# Patient Record
Sex: FEMALE | Race: WHITE | Employment: OTHER | URBAN - NONMETROPOLITAN AREA
[De-identification: names, ages, dates, MRNs, and addresses within clinical notes are randomized per-mention and may not be internally consistent; named-entity substitution may affect disease eponyms.]

---

## 2017-05-22 ENCOUNTER — HOSPITAL ENCOUNTER (OUTPATIENT)
Age: 67
Discharge: HOME | End: 2017-05-22
Payer: MEDICARE

## 2017-05-22 DIAGNOSIS — E78.5 HYPERLIPIDEMIA, UNSPECIFIED HYPERLIPIDEMIA TYPE: ICD-10-CM

## 2017-05-22 DIAGNOSIS — I10 ESSENTIAL HYPERTENSION: ICD-10-CM

## 2017-05-22 LAB
ABSOLUTE EOS #: 0.2 K/UL (ref 0–0.4)
ABSOLUTE LYMPH #: 0.8 K/UL (ref 1–4.8)
ABSOLUTE MONO #: 0.5 K/UL (ref 0–1)
ALBUMIN SERPL-MCNC: 4.4 G/DL (ref 3.5–5.2)
ALBUMIN/GLOBULIN RATIO: 1.5 (ref 1–2.5)
ALP BLD-CCNC: 112 U/L (ref 35–104)
ALT SERPL-CCNC: 31 U/L (ref 5–33)
ANION GAP SERPL CALCULATED.3IONS-SCNC: 14 MMOL/L (ref 9–17)
AST SERPL-CCNC: 24 U/L
BASOPHILS # BLD: 1 %
BASOPHILS ABSOLUTE: 0 K/UL (ref 0–0.2)
BILIRUB SERPL-MCNC: 0.36 MG/DL (ref 0.3–1.2)
BUN BLDV-MCNC: 15 MG/DL (ref 8–23)
BUN/CREAT BLD: 25 (ref 9–20)
CALCIUM SERPL-MCNC: 9.4 MG/DL (ref 8.6–10.4)
CHLORIDE BLD-SCNC: 100 MMOL/L (ref 98–107)
CHOLESTEROL/HDL RATIO: 2.6
CHOLESTEROL: 151 MG/DL
CO2: 26 MMOL/L (ref 20–31)
CREAT SERPL-MCNC: 0.59 MG/DL (ref 0.5–0.9)
DIFFERENTIAL TYPE: ABNORMAL
EOSINOPHILS RELATIVE PERCENT: 3 %
GFR AFRICAN AMERICAN: >60 ML/MIN
GFR NON-AFRICAN AMERICAN: >60 ML/MIN
GFR SERPL CREATININE-BSD FRML MDRD: ABNORMAL ML/MIN/{1.73_M2}
GFR SERPL CREATININE-BSD FRML MDRD: ABNORMAL ML/MIN/{1.73_M2}
GLUCOSE BLD-MCNC: 137 MG/DL (ref 70–99)
HCT VFR BLD CALC: 38.2 % (ref 36–46)
HDLC SERPL-MCNC: 57 MG/DL
HEMOGLOBIN: 12.8 G/DL (ref 12–16)
LDL CHOLESTEROL: 75 MG/DL (ref 0–130)
LYMPHOCYTES # BLD: 13 %
MCH RBC QN AUTO: 29.8 PG (ref 26–34)
MCHC RBC AUTO-ENTMCNC: 33.5 G/DL (ref 31–37)
MCV RBC AUTO: 89.1 FL (ref 80–100)
MONOCYTES # BLD: 8 %
PDW BLD-RTO: 13.8 % (ref 12.1–15.2)
PLATELET # BLD: 281 K/UL (ref 140–450)
PLATELET ESTIMATE: ABNORMAL
PMV BLD AUTO: ABNORMAL FL (ref 6–12)
POTASSIUM SERPL-SCNC: 4.3 MMOL/L (ref 3.7–5.3)
RBC # BLD: 4.29 M/UL (ref 4–5.2)
RBC # BLD: ABNORMAL 10*6/UL
SEG NEUTROPHILS: 75 %
SEGMENTED NEUTROPHILS ABSOLUTE COUNT: 4.7 K/UL (ref 1.8–7.7)
SODIUM BLD-SCNC: 140 MMOL/L (ref 135–144)
TOTAL PROTEIN: 7.4 G/DL (ref 6.4–8.3)
TRIGL SERPL-MCNC: 97 MG/DL
VLDLC SERPL CALC-MCNC: NORMAL MG/DL (ref 1–30)
WBC # BLD: 6.2 K/UL (ref 3.5–11)
WBC # BLD: ABNORMAL 10*3/UL

## 2017-05-22 PROCEDURE — 36415 COLL VENOUS BLD VENIPUNCTURE: CPT

## 2017-05-22 PROCEDURE — 80061 LIPID PANEL: CPT

## 2017-05-22 PROCEDURE — 80053 COMPREHEN METABOLIC PANEL: CPT

## 2017-05-22 PROCEDURE — 85025 COMPLETE CBC W/AUTO DIFF WBC: CPT

## 2017-05-24 PROBLEM — E11.9 TYPE 2 DIABETES MELLITUS WITHOUT COMPLICATION, WITHOUT LONG-TERM CURRENT USE OF INSULIN (HCC): Status: ACTIVE | Noted: 2017-05-24

## 2018-03-12 ENCOUNTER — HOSPITAL ENCOUNTER (OUTPATIENT)
Age: 68
Discharge: HOME | End: 2018-03-12
Payer: MEDICARE

## 2018-03-12 DIAGNOSIS — E11.9 TYPE 2 DIABETES MELLITUS WITHOUT COMPLICATION, WITHOUT LONG-TERM CURRENT USE OF INSULIN (HCC): ICD-10-CM

## 2018-03-12 DIAGNOSIS — I10 ESSENTIAL HYPERTENSION: ICD-10-CM

## 2018-03-12 LAB
ABSOLUTE EOS #: 0.2 K/UL (ref 0–0.44)
ABSOLUTE IMMATURE GRANULOCYTE: <0.03 K/UL (ref 0–0.3)
ABSOLUTE LYMPH #: 1.18 K/UL (ref 1.1–3.7)
ABSOLUTE MONO #: 0.5 K/UL (ref 0.1–1.2)
ALBUMIN SERPL-MCNC: 4.5 G/DL (ref 3.5–5.2)
ALBUMIN/GLOBULIN RATIO: 1.6 (ref 1–2.5)
ALP BLD-CCNC: 95 U/L (ref 35–104)
ALT SERPL-CCNC: 41 U/L (ref 5–33)
ANION GAP SERPL CALCULATED.3IONS-SCNC: 11 MMOL/L (ref 9–17)
AST SERPL-CCNC: 32 U/L
BASOPHILS # BLD: 1 % (ref 0–2)
BASOPHILS ABSOLUTE: 0.06 K/UL (ref 0–0.2)
BILIRUB SERPL-MCNC: 0.37 MG/DL (ref 0.3–1.2)
BUN BLDV-MCNC: 17 MG/DL (ref 8–23)
BUN/CREAT BLD: 28 (ref 9–20)
CALCIUM SERPL-MCNC: 10.1 MG/DL (ref 8.6–10.4)
CHLORIDE BLD-SCNC: 99 MMOL/L (ref 98–107)
CHOLESTEROL/HDL RATIO: 2.3
CHOLESTEROL: 149 MG/DL
CO2: 29 MMOL/L (ref 20–31)
CREAT SERPL-MCNC: 0.61 MG/DL (ref 0.5–0.9)
DIFFERENTIAL TYPE: ABNORMAL
EOSINOPHILS RELATIVE PERCENT: 3 % (ref 1–4)
GFR AFRICAN AMERICAN: >60 ML/MIN
GFR NON-AFRICAN AMERICAN: >60 ML/MIN
GFR SERPL CREATININE-BSD FRML MDRD: ABNORMAL ML/MIN/{1.73_M2}
GFR SERPL CREATININE-BSD FRML MDRD: ABNORMAL ML/MIN/{1.73_M2}
GLUCOSE BLD-MCNC: 134 MG/DL (ref 70–99)
HCT VFR BLD CALC: 40.3 % (ref 36.3–47.1)
HDLC SERPL-MCNC: 64 MG/DL
HEMOGLOBIN: 13.2 G/DL (ref 11.9–15.1)
IMMATURE GRANULOCYTES: 0 %
LDL CHOLESTEROL: 69 MG/DL (ref 0–130)
LYMPHOCYTES # BLD: 18 % (ref 24–43)
MCH RBC QN AUTO: 30.2 PG (ref 25.2–33.5)
MCHC RBC AUTO-ENTMCNC: 32.8 G/DL (ref 28.4–34.8)
MCV RBC AUTO: 92.2 FL (ref 82.6–102.9)
MONOCYTES # BLD: 8 % (ref 3–12)
NRBC AUTOMATED: 0 PER 100 WBC
PDW BLD-RTO: 12.8 % (ref 11.8–14.4)
PLATELET # BLD: 299 K/UL (ref 138–453)
PLATELET ESTIMATE: ABNORMAL
PMV BLD AUTO: 9.5 FL (ref 8.1–13.5)
POTASSIUM SERPL-SCNC: 4.4 MMOL/L (ref 3.7–5.3)
RBC # BLD: 4.37 M/UL (ref 3.95–5.11)
RBC # BLD: ABNORMAL 10*6/UL
SEG NEUTROPHILS: 70 % (ref 36–65)
SEGMENTED NEUTROPHILS ABSOLUTE COUNT: 4.51 K/UL (ref 1.5–8.1)
SODIUM BLD-SCNC: 139 MMOL/L (ref 135–144)
TOTAL PROTEIN: 7.4 G/DL (ref 6.4–8.3)
TRIGL SERPL-MCNC: 79 MG/DL
TSH SERPL DL<=0.05 MIU/L-ACNC: 2.16 MIU/L (ref 0.3–5)
VLDLC SERPL CALC-MCNC: NORMAL MG/DL (ref 1–30)
WBC # BLD: 6.5 K/UL (ref 3.5–11.3)
WBC # BLD: ABNORMAL 10*3/UL

## 2018-03-12 PROCEDURE — 85025 COMPLETE CBC W/AUTO DIFF WBC: CPT

## 2018-03-12 PROCEDURE — 80061 LIPID PANEL: CPT

## 2018-03-12 PROCEDURE — 80053 COMPREHEN METABOLIC PANEL: CPT

## 2018-03-12 PROCEDURE — 84443 ASSAY THYROID STIM HORMONE: CPT

## 2018-03-12 PROCEDURE — 36415 COLL VENOUS BLD VENIPUNCTURE: CPT

## 2019-02-18 ENCOUNTER — HOSPITAL ENCOUNTER (OUTPATIENT)
Dept: LAB | Age: 69
Discharge: HOME | End: 2019-02-18
Payer: MEDICARE

## 2019-02-18 DIAGNOSIS — E78.5 HYPERLIPIDEMIA, UNSPECIFIED HYPERLIPIDEMIA TYPE: ICD-10-CM

## 2019-02-18 DIAGNOSIS — E11.9 TYPE 2 DIABETES MELLITUS WITHOUT COMPLICATION, WITHOUT LONG-TERM CURRENT USE OF INSULIN (HCC): ICD-10-CM

## 2019-02-18 LAB
ABSOLUTE EOS #: 0.29 K/UL (ref 0–0.44)
ABSOLUTE IMMATURE GRANULOCYTE: <0.03 K/UL (ref 0–0.3)
ABSOLUTE LYMPH #: 1.25 K/UL (ref 1.1–3.7)
ABSOLUTE MONO #: 0.54 K/UL (ref 0.1–1.2)
ALBUMIN SERPL-MCNC: 4.4 G/DL (ref 3.5–5.2)
ALBUMIN/GLOBULIN RATIO: 1.4 (ref 1–2.5)
ALP BLD-CCNC: 115 U/L (ref 35–104)
ALT SERPL-CCNC: 38 U/L (ref 5–33)
ANION GAP SERPL CALCULATED.3IONS-SCNC: 12 MMOL/L (ref 9–17)
AST SERPL-CCNC: 29 U/L
BASOPHILS # BLD: 1 % (ref 0–2)
BASOPHILS ABSOLUTE: 0.08 K/UL (ref 0–0.2)
BILIRUB SERPL-MCNC: 0.49 MG/DL (ref 0.3–1.2)
BUN BLDV-MCNC: 14 MG/DL (ref 8–23)
BUN/CREAT BLD: 20 (ref 9–20)
CALCIUM SERPL-MCNC: 9.6 MG/DL (ref 8.6–10.4)
CHLORIDE BLD-SCNC: 104 MMOL/L (ref 98–107)
CHOLESTEROL/HDL RATIO: 2.6
CHOLESTEROL: 146 MG/DL
CO2: 25 MMOL/L (ref 20–31)
CREAT SERPL-MCNC: 0.69 MG/DL (ref 0.5–0.9)
CREATININE URINE: 318.2 MG/DL (ref 28–217)
DIFFERENTIAL TYPE: ABNORMAL
EOSINOPHILS RELATIVE PERCENT: 4 % (ref 1–4)
GFR AFRICAN AMERICAN: >60 ML/MIN
GFR NON-AFRICAN AMERICAN: >60 ML/MIN
GFR SERPL CREATININE-BSD FRML MDRD: ABNORMAL ML/MIN/{1.73_M2}
GFR SERPL CREATININE-BSD FRML MDRD: ABNORMAL ML/MIN/{1.73_M2}
GLUCOSE BLD-MCNC: 151 MG/DL (ref 70–99)
HCT VFR BLD CALC: 42.4 % (ref 36.3–47.1)
HDLC SERPL-MCNC: 56 MG/DL
HEMOGLOBIN: 13.5 G/DL (ref 11.9–15.1)
IMMATURE GRANULOCYTES: 0 %
LDL CHOLESTEROL: 70 MG/DL (ref 0–130)
LYMPHOCYTES # BLD: 19 % (ref 24–43)
MCH RBC QN AUTO: 30.1 PG (ref 25.2–33.5)
MCHC RBC AUTO-ENTMCNC: 31.8 G/DL (ref 28.4–34.8)
MCV RBC AUTO: 94.4 FL (ref 82.6–102.9)
MICROALBUMIN/CREAT 24H UR: 134 MG/L
MICROALBUMIN/CREAT UR-RTO: 42 MCG/MG CREAT
MONOCYTES # BLD: 8 % (ref 3–12)
NRBC AUTOMATED: 0 PER 100 WBC
PDW BLD-RTO: 12.8 % (ref 11.8–14.4)
PLATELET # BLD: 302 K/UL (ref 138–453)
PLATELET ESTIMATE: ABNORMAL
PMV BLD AUTO: 9.4 FL (ref 8.1–13.5)
POTASSIUM SERPL-SCNC: 5 MMOL/L (ref 3.7–5.3)
RBC # BLD: 4.49 M/UL (ref 3.95–5.11)
RBC # BLD: ABNORMAL 10*6/UL
SEG NEUTROPHILS: 68 % (ref 36–65)
SEGMENTED NEUTROPHILS ABSOLUTE COUNT: 4.49 K/UL (ref 1.5–8.1)
SODIUM BLD-SCNC: 141 MMOL/L (ref 135–144)
TOTAL PROTEIN: 7.5 G/DL (ref 6.4–8.3)
TRIGL SERPL-MCNC: 99 MG/DL
TSH SERPL DL<=0.05 MIU/L-ACNC: 3.06 MIU/L (ref 0.3–5)
VLDLC SERPL CALC-MCNC: NORMAL MG/DL (ref 1–30)
WBC # BLD: 6.7 K/UL (ref 3.5–11.3)
WBC # BLD: ABNORMAL 10*3/UL

## 2019-02-18 PROCEDURE — 82570 ASSAY OF URINE CREATININE: CPT

## 2019-02-18 PROCEDURE — 85025 COMPLETE CBC W/AUTO DIFF WBC: CPT

## 2019-02-18 PROCEDURE — 36415 COLL VENOUS BLD VENIPUNCTURE: CPT

## 2019-02-18 PROCEDURE — 80061 LIPID PANEL: CPT

## 2019-02-18 PROCEDURE — 80053 COMPREHEN METABOLIC PANEL: CPT

## 2019-02-18 PROCEDURE — 82043 UR ALBUMIN QUANTITATIVE: CPT

## 2019-02-18 PROCEDURE — 84443 ASSAY THYROID STIM HORMONE: CPT

## 2019-03-11 ENCOUNTER — HOSPITAL ENCOUNTER (OUTPATIENT)
Dept: GENERAL RADIOLOGY | Age: 69
Discharge: HOME | End: 2019-03-13
Payer: MEDICARE

## 2019-03-11 ENCOUNTER — HOSPITAL ENCOUNTER (OUTPATIENT)
Age: 69
Discharge: HOME | End: 2019-03-13
Payer: MEDICARE

## 2019-03-11 DIAGNOSIS — M54.9 MID BACK PAIN: ICD-10-CM

## 2019-03-11 PROCEDURE — 72072 X-RAY EXAM THORAC SPINE 3VWS: CPT

## 2019-03-12 ENCOUNTER — HOSPITAL ENCOUNTER (OUTPATIENT)
Dept: PHYSICAL THERAPY | Age: 69
Setting detail: THERAPIES SERIES
Discharge: HOME | End: 2019-03-12
Payer: MEDICARE

## 2019-03-12 PROCEDURE — 97140 MANUAL THERAPY 1/> REGIONS: CPT

## 2019-03-12 PROCEDURE — 97161 PT EVAL LOW COMPLEX 20 MIN: CPT

## 2019-03-15 ENCOUNTER — HOSPITAL ENCOUNTER (OUTPATIENT)
Dept: PHYSICAL THERAPY | Age: 69
Setting detail: THERAPIES SERIES
Discharge: HOME | End: 2019-03-15
Payer: MEDICARE

## 2019-03-15 PROCEDURE — G0283 ELEC STIM OTHER THAN WOUND: HCPCS

## 2019-03-15 PROCEDURE — 97110 THERAPEUTIC EXERCISES: CPT

## 2019-03-15 PROCEDURE — 97140 MANUAL THERAPY 1/> REGIONS: CPT

## 2019-03-20 ENCOUNTER — HOSPITAL ENCOUNTER (OUTPATIENT)
Dept: PHYSICAL THERAPY | Age: 69
Setting detail: THERAPIES SERIES
Discharge: HOME | End: 2019-03-20
Payer: MEDICARE

## 2019-03-20 PROCEDURE — G0283 ELEC STIM OTHER THAN WOUND: HCPCS

## 2019-03-20 PROCEDURE — 97110 THERAPEUTIC EXERCISES: CPT

## 2019-03-22 ENCOUNTER — HOSPITAL ENCOUNTER (OUTPATIENT)
Dept: PHYSICAL THERAPY | Age: 69
Setting detail: THERAPIES SERIES
Discharge: HOME | End: 2019-03-22
Payer: MEDICARE

## 2019-03-22 PROCEDURE — G0283 ELEC STIM OTHER THAN WOUND: HCPCS

## 2019-03-22 PROCEDURE — 97110 THERAPEUTIC EXERCISES: CPT

## 2019-03-26 ENCOUNTER — HOSPITAL ENCOUNTER (OUTPATIENT)
Dept: PHYSICAL THERAPY | Age: 69
Setting detail: THERAPIES SERIES
Discharge: HOME | End: 2019-03-26
Payer: MEDICARE

## 2019-03-26 PROCEDURE — G0283 ELEC STIM OTHER THAN WOUND: HCPCS

## 2019-03-26 PROCEDURE — 97110 THERAPEUTIC EXERCISES: CPT

## 2019-03-28 ENCOUNTER — HOSPITAL ENCOUNTER (OUTPATIENT)
Dept: PHYSICAL THERAPY | Age: 69
Setting detail: THERAPIES SERIES
Discharge: HOME | End: 2019-03-28
Payer: MEDICARE

## 2019-03-28 PROCEDURE — 97110 THERAPEUTIC EXERCISES: CPT

## 2019-04-02 ENCOUNTER — HOSPITAL ENCOUNTER (OUTPATIENT)
Dept: PHYSICAL THERAPY | Age: 69
Setting detail: THERAPIES SERIES
Discharge: HOME | End: 2019-04-02
Payer: MEDICARE

## 2019-05-23 NOTE — DISCHARGE SUMMARY
Phone: Karley          Fax: [phone]                            Outpatient Physical Therapy                                                                    Discharge Summary    Patient: Mariposa Montana  : 1950  CSN #: [id number]   Referring physician: No admitting provider for patient encounter. Referring Practitioner: Bob Santacruz MD      Diagnosis: Mid back pain, M54.9      Date Treatment Initiated: 3/12/19  Date of Last Treatment: 3/28/19      PT Visit Information  Onset Date: 19  PT Insurance Information: Medicare  Total # of Visits Approved: 8  Total # of Visits to Date: 6  Plan of Care/Certification Expiration Date: 19  No Show: 0  Canceled Appointment: 0      Frequency/Duration  2 times per week  4 weeks      Treatment Received  [x] HP/CP      [x] Electrical Stim   [x] Therapeutic Exercise      [] Gait Training  [x] Aquatics   [x] Ultrasound         [x] Patient Education/HEP   [x] Manual Therapy  [] Traction    [x] Neuro-abigail        [x] Soft Tissue Mobs            [] Home TENS  [] Iontophoresis    [] Orthotic casting/fitting      [x] Dry Needling    Assessment  Assessment: At last attended visit, patient had met 4 out of 5 goals for independence with HEP, and decreased pain and made good progress towards improved thoracic spine AROM with no pain reported with movements. Pt. cancelled remaining visits and did not return to therapy. Will d/c patient at this time.         Goals  Short term goals  Time Frame for Short term goals: 2 weeks  Short term goal 1: Patient will be initiated with a HEP -MET  Short term goal 2: Patient will report a 10% decrease in pain levels -MET    Long term goals  Time Frame for Long term goals : 4 weeks  Long term goal 1: Patient will be independent and compliant with a HEP -MET  Long term goal 2: Patient will improve thoracic spine ROM to >/= to 75% in all planes  Long term goal 3: Patient will report decreased pain to <4/10 at worst -MET (1/10)      Reason for Discharge  [x] Goals Achieved                        []  Poor Follow Through/Attendance                  [x]  Optimal Function Achieved     [x]  Patient Discharged Self    []  Hospitalization                         []  Physician discharge      Thank you for this referral      Chino Art PT, DPT               Date: 4/11/2019

## 2019-08-19 ENCOUNTER — HOSPITAL ENCOUNTER (OUTPATIENT)
Age: 69
Discharge: HOME | End: 2019-08-19
Payer: MEDICARE

## 2019-08-19 DIAGNOSIS — E11.9 TYPE 2 DIABETES MELLITUS WITHOUT COMPLICATION, WITHOUT LONG-TERM CURRENT USE OF INSULIN (HCC): ICD-10-CM

## 2019-08-19 LAB
ANION GAP SERPL CALCULATED.3IONS-SCNC: 12 MMOL/L (ref 9–17)
BUN BLDV-MCNC: 17 MG/DL (ref 8–23)
BUN/CREAT BLD: 23 (ref 9–20)
CALCIUM SERPL-MCNC: 9.5 MG/DL (ref 8.6–10.4)
CHLORIDE BLD-SCNC: 101 MMOL/L (ref 98–107)
CO2: 25 MMOL/L (ref 20–31)
CREAT SERPL-MCNC: 0.75 MG/DL (ref 0.5–0.9)
GFR AFRICAN AMERICAN: >60 ML/MIN
GFR NON-AFRICAN AMERICAN: >60 ML/MIN
GFR SERPL CREATININE-BSD FRML MDRD: ABNORMAL ML/MIN/{1.73_M2}
GFR SERPL CREATININE-BSD FRML MDRD: ABNORMAL ML/MIN/{1.73_M2}
GLUCOSE BLD-MCNC: 151 MG/DL (ref 70–99)
POTASSIUM SERPL-SCNC: 4 MMOL/L (ref 3.7–5.3)
SODIUM BLD-SCNC: 138 MMOL/L (ref 135–144)

## 2019-08-19 PROCEDURE — 80048 BASIC METABOLIC PNL TOTAL CA: CPT

## 2019-08-19 PROCEDURE — 36415 COLL VENOUS BLD VENIPUNCTURE: CPT

## 2019-12-16 ENCOUNTER — TELEPHONE (OUTPATIENT)
Dept: GASTROENTEROLOGY | Age: 69
End: 2019-12-16

## 2019-12-16 DIAGNOSIS — Z12.11 COLON CANCER SCREENING: Primary | ICD-10-CM

## 2019-12-20 PROBLEM — Z12.11 COLON CANCER SCREENING: Status: ACTIVE | Noted: 2019-12-20

## 2019-12-20 RX ORDER — SODIUM, POTASSIUM,MAG SULFATES 17.5-3.13G
SOLUTION, RECONSTITUTED, ORAL ORAL
Qty: 2 BOTTLE | Refills: 0 | Status: SHIPPED | OUTPATIENT
Start: 2019-12-20

## 2020-02-08 PROBLEM — Z12.11 COLON CANCER SCREENING: Status: RESOLVED | Noted: 2019-12-20 | Resolved: 2020-02-08

## 2020-07-08 ENCOUNTER — OFFICE VISIT (OUTPATIENT)
Dept: CARDIOLOGY | Age: 70
End: 2020-07-08
Payer: MEDICARE

## 2020-07-08 VITALS
HEIGHT: 69 IN | HEART RATE: 90 BPM | RESPIRATION RATE: 18 BRPM | DIASTOLIC BLOOD PRESSURE: 79 MMHG | SYSTOLIC BLOOD PRESSURE: 132 MMHG | OXYGEN SATURATION: 97 % | WEIGHT: 231.8 LBS | BODY MASS INDEX: 34.33 KG/M2

## 2020-07-08 PROCEDURE — G8427 DOCREV CUR MEDS BY ELIG CLIN: HCPCS | Performed by: INTERNAL MEDICINE

## 2020-07-08 PROCEDURE — G8417 CALC BMI ABV UP PARAM F/U: HCPCS | Performed by: INTERNAL MEDICINE

## 2020-07-08 PROCEDURE — 99211 OFF/OP EST MAY X REQ PHY/QHP: CPT | Performed by: INTERNAL MEDICINE

## 2020-07-08 PROCEDURE — 1036F TOBACCO NON-USER: CPT | Performed by: INTERNAL MEDICINE

## 2020-07-08 PROCEDURE — 99214 OFFICE O/P EST MOD 30 MIN: CPT | Performed by: INTERNAL MEDICINE

## 2020-07-08 PROCEDURE — 1123F ACP DISCUSS/DSCN MKR DOCD: CPT | Performed by: INTERNAL MEDICINE

## 2020-07-08 PROCEDURE — 3017F COLORECTAL CA SCREEN DOC REV: CPT | Performed by: INTERNAL MEDICINE

## 2020-07-08 PROCEDURE — 93005 ELECTROCARDIOGRAM TRACING: CPT | Performed by: INTERNAL MEDICINE

## 2020-07-08 PROCEDURE — 4040F PNEUMOC VAC/ADMIN/RCVD: CPT | Performed by: INTERNAL MEDICINE

## 2020-07-08 PROCEDURE — 93010 ELECTROCARDIOGRAM REPORT: CPT | Performed by: INTERNAL MEDICINE

## 2020-07-08 PROCEDURE — G8399 PT W/DXA RESULTS DOCUMENT: HCPCS | Performed by: INTERNAL MEDICINE

## 2020-07-08 PROCEDURE — 1090F PRES/ABSN URINE INCON ASSESS: CPT | Performed by: INTERNAL MEDICINE

## 2020-07-08 RX ORDER — TORSEMIDE 20 MG/1
20 TABLET ORAL DAILY
Qty: 90 TABLET | Refills: 3 | Status: SHIPPED | OUTPATIENT
Start: 2020-07-08

## 2020-07-08 NOTE — PATIENT INSTRUCTIONS
SURVEY:    You may be receiving a survey from [employer] regarding your visit today. Please complete the survey to enable us to provide the highest quality of care to you and your family. If you cannot score us a very good on any question, please call the office to discuss how we could have made your experience a very good one. Thank you.

## 2020-07-08 NOTE — PROGRESS NOTES
Mannie Longoria am scribing for and in the presence of Brittny Hooks MD, F.A.C.C..    Subjective:     CHIEF COMPLAINT / HPI:   Chief Complaint   Patient presents with    Follow-up     Pt is here for chest heaviness and swelling for the last few months. She has started walking a mile a day. She does have SOB. She has had dizziness, no falls or near falls. Denied: palpitations. Dear Dr. Ricardo Olson, APRN - CNP    I had the pleasure of seeing Ms. George for follow up on her cardiac status today. Yair Norman is 72 y.o. female who was initially presented in 12/2014 with shortness of breath, abnormal ECG and bilateral lower extremity swelling. She does have hypertension,hyperlipidemia and diabetes. She is a non smoker. She has no family history of heart disease. 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 Ms. Hudson Rodriguez is here today for cardiac clearance. She is having chest heaviness and leg swelling. She reported that her leg swelling is getting worse but denied any significant weight gain. She also denied any worsening shortness of breath. She does sleep well at night, she does not wake up gasping for air or choking. She is not sure if she snores. She does have shortness of breath on exertion that stayed the same over the past few month. She attributes this to her weight and deconditioning. She denied fever or cough. No abdominal pain, nausea or vomiting. No problems moving her bowel. . She denies any palpitations. No dizziness or lightheadedness. Exercise Tolerance: Ms. Hudson Rodriguez reports that she has a fairly good exercise tolerance. Her says that she could walk 1 mile without developing chest discomfort or significant shortness of breath. She does do a 1/2 mile at a time.      ECG done in office today 7/8/2020: Sinus rhythm with incomplete right bundle branch block and left atrial enlargement. She has ST segment and T wave abnormalities in the anterolateral leads that did not change from prior. She had an echo done at [facility] on 6/26/2020 and 20 that showed normal ejection fraction of 65 to 70%. Mild LVH. No mention of right ventricular dilatation or strain. No significant pulmonary hypertension reported. Blood work on 6/18/2020 showed normal troponin, unremarkable CBC, normal kidney function and serum potassium level. She has mildly elevated AST and ALT. Her alkaline phosphatase is normal.  Her hemoglobin A1c is 7.4%    She had an ultrasound of the abdomen done on 1/10/2020 that showed cholelithiasis and hepatic steatosis. She is scheduled to do vascular studies for bilateral lower extremities because of suspected venous insufficiency/systemic venous hypertension. She told me that she is scheduled to do an arterial and venous study, this will be followed by seeing vascular surgery specialist.    Past Medical History:    Past Medical History:   Diagnosis Date    Cardiac catheterisation as the cause of abnormal reaction of patient, or of later complication 1501    [facility] Patient stated it was fine    GERD (gastroesophageal reflux disease)     H/O echocardiogram 06/20/2016    EF >60%. LV wall thickness is mildly increased. Mild mitral regurgitation. Anterior echo free space is seen suggestive of a small effusion or fat pad. Mild ( grade l)  diastolic disfunction.     History of cardiovascular stress test 12/23/14    Hyperlipidemia     Hypertension     Osteoarthritis      Past Surgical History:  Past Surgical History:   Procedure Laterality Date    APPENDECTOMY      CARDIAC CATHETERIZATION Left 1/14/15    right wrist/ Dr. Jose Carlos Hoff COLONOSCOPY  06/04/2009    normal- Dr Michele Polanco Left 2008    Dr. Watson Kins History:    Social History Tobacco Use   Smoking Status Never Smoker   Smokeless Tobacco Never Used     Current Medications:  Outpatient Medications Marked as Taking for the 7/8/20 encounter (Office Visit) with Rina Rodriguez MD   Medication Sig Dispense Refill    esomeprazole ([address]) 20 MG delayed release capsule Take 20 mg by mouth every morning (before breakfast)      torsemide (DEMADEX) 20 MG tablet Take 1 tablet by mouth daily 90 tablet 3    spironolactone (ALDACTONE) 25 MG tablet TAKE 1 TABLET TWICE DAILY 180 tablet 0    metoprolol succinate (TOPROL XL) 25 MG extended release tablet TAKE 1 TABLET EVERY DAY 90 tablet 0    atorvastatin (LIPITOR) 20 MG tablet TAKE 1 TABLET EVERY DAY 90 tablet 0    metFORMIN (GLUCOPHAGE) 500 MG tablet TAKE 1 TABLET TWICE DAILY WITH MEALS (Patient taking differently: Take 1,000 mg by mouth 2 times daily (with meals) TAKE 1 TABLET TWICE DAILY WITH MEALS) 180 tablet 0    Blood Glucose Monitoring Suppl ([employer] CONTOUR MONITOR) w/Device KIT USE QD TO MONITOR BLOOD SUGAR DX:E11.9 1 kit 0    glucose blood VI test strips (KIKO CONTOUR TEST) strip USE 1 QD TO MONITOR GLUCOSE DX:E11.9 100 each 3    Lancets MISC 1 each by Does not apply route daily E11.9 100 each 3    aspirin 325 MG tablet Take 325 mg by mouth daily         REVIEW OF SYSTEMS:    CONSTITUTIONAL: No major weight gain or loss, fatigue, weakness, night sweats or fever. HEENT: No new vision difficulties or ringing in the ears. RESPIRATORY: See HPI   CARDIOVASCULAR: See HPI  GI: No nausea, vomiting, diarrhea, constipation, abdominal pain or changes in bowel habits. : No urinary frequency, urgency, incontinence hematuria or dysuria. SKIN: No cyanosis or skin lesions. MUSCULOSKELETAL: No new muscle or joint pain. NEUROLOGICAL: No syncope or TIA-like symptoms.   PSYCHIATRIC: No anxiety, pain, insomnia or depression    Objective:     PHYSICAL EXAM:      VITALS:    /79 (Site: Left Upper Arm, Position: Sitting, Cuff Size: Large Adult) Pulse 90   Resp 18   Ht 5' 9.02\" (1.753 m)   Wt 231 lb 12.8 oz (105.1 kg)   SpO2 97%   BMI 34.22 kg/m²   CONSTITUTIONAL: Cooperative, no apparent distress, and appears well nourished / developed. NEUROLOGIC:  Awake and orientated to person, place and time. PSYCH: Calm affect. SKIN: Warm and dry. HEENT: Sclera non-icteric, normocephalic, neck supple, no elevation of JVP, normal carotid pulses with no bruits and thyroid normal size. LUNGS:  No increased work of breathing and clear to auscultation, no crackles or wheezing. CARDIOVASCULAR:  Regular rate and rhythm with no murmurs, gallops, rubs, or abnormal heart sounds. The apical impulses not displaced. The carotid upstroke is normal in amplitude and contour without delay or bruit. No JVD  ABDOMEN:  Normal bowel sounds, non-distended and non-tender to palpation. EXT: 2+ pitting edema up to the knees bilaterally. Distal pulses are palpable. DATA:    Lab Results   Component Value Date    ALT 38 (H) 02/18/2019    AST 29 02/18/2019    ALKPHOS 115 (H) 02/18/2019    BILITOT 0.49 02/18/2019     Lab Results   Component Value Date    CREATININE 0.75 08/19/2019    BUN 17 08/19/2019     08/19/2019    K 4.0 08/19/2019     08/19/2019    CO2 25 08/19/2019     Lab Results   Component Value Date    TSH 3.06 02/18/2019     Lab Results   Component Value Date    WBC 6.7 02/18/2019    HGB 13.5 02/18/2019    HCT 42.4 02/18/2019    MCV 94.4 02/18/2019     02/18/2019     No components found for: CHLPL  Lab Results   Component Value Date    TRIG 99 02/18/2019    TRIG 79 03/12/2018    TRIG 97 05/22/2017     Lab Results   Component Value Date    HDL 56 02/18/2019    HDL 64 03/12/2018    HDL 57 05/22/2017     Lab Results   Component Value Date    LDLCHOLESTEROL 70 02/18/2019    LDLCHOLESTEROL 69 03/12/2018    LDLCHOLESTEROL 75 05/22/2017       Assessment:      Diagnosis Orders   1.  Pre-operative clearance  EKG 12 lead    torsemide (DEMADEX) 20 MG tablet · Emergent procedure No  · Active Cardiac Condition No (decompensated HF, Arrhythmia, MI <3 weeks, severe valve disease)  · Risk Level of Procedure Intermediate Risk (intraperitoneal, intrathoracic, HENT, orthopedic, or carotid endarterectomy, etc.)  · Revised Cardiac Risk Index Risk factors: History of heart failure and Abnormal ECG  · Measurement of Exercise Tolerance before Surgery >4 Yes  · According to the 2014 ACC/AHA pre-operative risk assessment guidelines Mary Pearce is at intermediate risk for major cardiac complications during a intermediate risk procedure and may continue as planned. Specific medication recommendations are listed below. Medications recommended to continue should be taken with a sip of water even when NPO.  Medical management to reduce perioperative risk:   Antiplatelet Agent: Okay to hold aspirin for 7 days prior to the planned procedure   Additional Recommendations: I would also suggest that she continue her beta blocker and statin throughout the perioperative period.  Additional Testing: Because of her current problems therefore I ordered a myoview stress test with SPECT imaging to try and rule out this possibility.  Chronic diastolic heart failure: New York Heart Association Class: IIa (Mild symptoms only in normal activities)   Beta Blocker: Continue Metoprolol succinate (Toprol XL) 25 mg daily.  ACE Inibitor/ARB: Not indicated at this time.  Diuretics: STOP furosemide (Lasix) and START torsemide (Demodex) 20 mg every morning.  Heart failure counseling: I advised them to try and keep their legs up whenever possible and to limit salt in their diet.  Additional Testing List: None      Essential Hypertension: Controlled  · Beta Blocker: Continue Metoprolol succinate (Toprol XL) 25 mg daily. · ACE Inibitor/ARB: Not indicated at this time. · Diuretics: STOP furosemide (Lasix) and START torsemide (Demodex) 20 mg every morning.       · Hyperlipidemia:

## 2020-07-13 ENCOUNTER — HOSPITAL ENCOUNTER (OUTPATIENT)
Dept: NON INVASIVE DIAGNOSTICS | Age: 70
Discharge: HOME | End: 2020-07-13
Payer: MEDICARE

## 2020-07-13 PROCEDURE — 93017 CV STRESS TEST TRACING ONLY: CPT

## 2020-07-13 PROCEDURE — A9500 TC99M SESTAMIBI: HCPCS | Performed by: INTERNAL MEDICINE

## 2020-07-13 PROCEDURE — 78452 HT MUSCLE IMAGE SPECT MULT: CPT

## 2020-07-13 PROCEDURE — 3430000000 HC RX DIAGNOSTIC RADIOPHARMACEUTICAL: Performed by: INTERNAL MEDICINE

## 2020-07-13 RX ADMIN — Medication 30 MILLICURIE: at 10:32

## 2020-07-14 ENCOUNTER — HOSPITAL ENCOUNTER (OUTPATIENT)
Dept: NON INVASIVE DIAGNOSTICS | Age: 70
Discharge: HOME | End: 2020-07-14
Payer: MEDICARE

## 2020-07-14 PROCEDURE — A9500 TC99M SESTAMIBI: HCPCS | Performed by: INTERNAL MEDICINE

## 2020-07-14 PROCEDURE — 3430000000 HC RX DIAGNOSTIC RADIOPHARMACEUTICAL: Performed by: INTERNAL MEDICINE

## 2020-07-14 PROCEDURE — 78452 HT MUSCLE IMAGE SPECT MULT: CPT

## 2020-07-14 RX ADMIN — Medication 30 MILLICURIE: at 13:33

## 2020-07-21 NOTE — PROCEDURES
[address] [facility], [address] [facility]                              CARDIAC STRESS TEST    PATIENT NAME: Sandra Story                :        1950  MED REC NO:   [id number]                              ROOM:  ACCOUNT NO:   [de-identified]                           ADMIT DATE: 2020  PROVIDER:     Xochitl Pollock    CARDIOVASCULAR DIAGNOSTIC DEPARTMENT    DATE OF STUDY:  2020    ORDERING PROVIDER:  Nando Brar MD    PRIMARY CARE PROVIDER:  Kat Beach CNP    INTERPRETING PHYSICIAN:  Xochitl Pollock MD    EXERCISE MYOCARDIAL PERFUSION STRESS TEST REPORT    Stress/rest single-isotope SPECT imaging with exercise stress and gated  SPECT imaging    INDICATION:  Assessment of a cardiac cause of:  Abnormal ECG, CHF, hypertension    CLINICAL HISTORY:  The patient is a 81-year-old woman with no known  coronary artery disease. Previous cardiac history includes:  Stress test, cardiac catheterization    Other previous history includes:  Chest pain, lightheadedness, diabetes  mellitus, indigestion, heartburn, family history of CAD, hypertension    Symptoms just prior to testing included:  None    Relevant medications:  Metoprolol    PROCEDURE:  The patient performed treadmill exercise using a Raman  protocol, completing 5:05 minutes and completing an estimated workload  of 6.64 metabolic equivalents (METS). The test was terminated due to fatigue and shortness of breath. The heart rate was 97 beats per minute at baseline and increased to 134  beats per minute at peak exercise, which was 88% of the maximum  predicted heart rate. The rest blood pressure was 128/70 mmHg and  increased to 184/70 mmHg, which is a hypertensive response. During the  procedure, the patient developed fatigue, shortness of breath, and leg  fatigue but denied any chest discomfort.     Myocardial perfusion imaging: Imaging was performed at rest 30-45  minutes following the injection of 30.0 mCi of sestamibi. At peak  exercise, the patient was injected with 30.0 mCi of sestamibi and  exercise was continued for 1 minute. Gating post-stress tomographic  imaging was performed 30-45 minutes after stress. STRESS ECG RESULTS:  The resting electrocardiogram demonstrated normal  sinus rhythm with ST-segment abnormalities suggestive of myocardial  ischemia. At peak exercise and during recovery, the patient developed:    Horizontal ST segment changes in lead(s) II, III, aVF, V5, V6 which did  meet diagnostic criteria for myocardial ischemia with occasional  premature atrial contractions (PACs) and occasional premature  ventricular contractions (PVCs). NUCLEAR IMAGING RESULTS:  The overall quality of the study is excellent. Mild attenuation artifact was seen. There is no evidence of abnormal  lung uptake. Additionally, the right ventricle appears normal.  The  left ventricular cavity is noted to be normal in size on the stress  images. There is no evidence of transient ischemic dilatation (TID) of  the left ventricle. Gated SPECT imaging reveals normal myocardial thickening and wall motion  with a calculated left ventricular ejection fraction of 72%. The rest images demonstrated a small perfusion abnormality of mild  intensity in the inferolateral region(s) which is most likely due to  artifact. SPECT images demonstrate homogeneous tracer distribution throughout the  myocardium. IMPRESSION:  1. Largely normal myocardial perfusion imaging with soft tissue artifact  but without evidence of significant myocardial ischemia or infarction. 2.  Global left ventricular systolic function was normal without  regional wall motion abnormalities. 3.  Significant electrocardiographic evidence of myocardial ischemia  during EKG monitoring without significant associated arrhythmias.     The patient's Duke Treadmill score is 0, which correlates with

## 2020-07-22 ENCOUNTER — TELEPHONE (OUTPATIENT)
Dept: CARDIOLOGY | Age: 70
End: 2020-07-22

## 2020-07-22 NOTE — TELEPHONE ENCOUNTER
----- Message from Karely Padilla MD sent at 7/22/2020  9:42 AM EDT -----  Test result normal.  No further action needed.

## 2020-10-13 ENCOUNTER — HOSPITAL ENCOUNTER (OUTPATIENT)
Dept: LAB | Age: 70
Setting detail: SPECIMEN
Discharge: HOME | End: 2020-10-13
Payer: MEDICARE

## 2020-10-13 LAB
AVERAGE GLUCOSE: NORMAL
HBA1C MFR BLD: 7.6 %

## 2020-10-13 PROCEDURE — C9803 HOPD COVID-19 SPEC COLLECT: HCPCS

## 2020-10-13 PROCEDURE — U0003 INFECTIOUS AGENT DETECTION BY NUCLEIC ACID (DNA OR RNA); SEVERE ACUTE RESPIRATORY SYNDROME CORONAVIRUS 2 (SARS-COV-2) (CORONAVIRUS DISEASE [COVID-19]), AMPLIFIED PROBE TECHNIQUE, MAKING USE OF HIGH THROUGHPUT TECHNOLOGIES AS DESCRIBED BY CMS-2020-01-R: HCPCS

## 2020-10-14 LAB — SARS-COV-2, NAA: DETECTED

## 2020-10-15 ENCOUNTER — TELEPHONE (OUTPATIENT)
Dept: PRIMARY CARE CLINIC | Age: 70
End: 2020-10-15

## 2020-10-15 ENCOUNTER — TELEPHONE (OUTPATIENT)
Dept: ADMINISTRATIVE | Age: 70
End: 2020-10-15

## 2021-07-27 ENCOUNTER — TELEPHONE (OUTPATIENT)
Dept: PRIMARY CARE CLINIC | Age: 71
End: 2021-07-27

## 2021-07-27 VITALS — SYSTOLIC BLOOD PRESSURE: 116 MMHG | DIASTOLIC BLOOD PRESSURE: 79 MMHG

## 2021-07-27 NOTE — TELEPHONE ENCOUNTER
Patient has new PCP. BP recorded 04/30/2021 at [facility] encounter 116/79.